# Patient Record
Sex: FEMALE | Race: WHITE
[De-identification: names, ages, dates, MRNs, and addresses within clinical notes are randomized per-mention and may not be internally consistent; named-entity substitution may affect disease eponyms.]

---

## 2020-02-11 ENCOUNTER — HOSPITAL ENCOUNTER (EMERGENCY)
Dept: HOSPITAL 12 - ER | Age: 86
Discharge: HOME | End: 2020-02-11
Payer: MEDICARE

## 2020-02-11 VITALS — HEIGHT: 65 IN | WEIGHT: 133 LBS | BODY MASS INDEX: 22.16 KG/M2

## 2020-02-11 DIAGNOSIS — Z88.0: ICD-10-CM
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
## 2020-02-11 DIAGNOSIS — Z88.1: ICD-10-CM
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
## 2020-02-11 DIAGNOSIS — Z88.2: ICD-10-CM

## 2020-02-11 DIAGNOSIS — S81.011A: Primary | ICD-10-CM

## 2020-02-11 PROCEDURE — A4217 STERILE WATER/SALINE, 500 ML: HCPCS

## 2020-02-11 PROCEDURE — A4663 DIALYSIS BLOOD PRESSURE CUFF: HCPCS

## 2020-02-11 PROCEDURE — 99283 EMERGENCY DEPT VISIT LOW MDM: CPT

## 2020-02-11 PROCEDURE — 90715 TDAP VACCINE 7 YRS/> IM: CPT

## 2020-02-11 PROCEDURE — 73564 X-RAY EXAM KNEE 4 OR MORE: CPT

## 2020-02-11 PROCEDURE — 90471 IMMUNIZATION ADMIN: CPT

## 2020-02-11 PROCEDURE — 12001 RPR S/N/AX/GEN/TRNK 2.5CM/<: CPT

## 2020-02-11 NOTE — NUR
right knee with stitches in place area medicated and covered with surgical 
dressing. DCD instructions given to patient, instructed to come back as 
indicated for stitches removal. Pt. verbalized understanding.

## 2021-04-13 ENCOUNTER — OFFICE (OUTPATIENT)
Dept: URBAN - METROPOLITAN AREA CLINIC 45 | Facility: CLINIC | Age: 86
End: 2021-04-13

## 2021-04-13 VITALS
DIASTOLIC BLOOD PRESSURE: 81 MMHG | WEIGHT: 133 LBS | HEIGHT: 64 IN | TEMPERATURE: 96.8 F | HEART RATE: 81 BPM | SYSTOLIC BLOOD PRESSURE: 157 MMHG

## 2021-04-13 DIAGNOSIS — Z80.0 FAMILY HISTORY OF MALIGNANT NEOPLASM OF COLON: ICD-10-CM

## 2021-04-13 DIAGNOSIS — R10.32 LLQ PAIN: ICD-10-CM

## 2021-04-13 DIAGNOSIS — K59.00 CONSTIPATION, CHRONIC: ICD-10-CM

## 2021-04-13 PROCEDURE — 99203 OFFICE O/P NEW LOW 30 MIN: CPT | Performed by: INTERNAL MEDICINE

## 2021-04-13 NOTE — SERVICEHPINOTES
The patient complains of abdominal pain.    Symptoms began   several  weeks   ago with onset that was    abrupt  .    It is localized as   left lower quadrant   pain.    It is described as   moderate   in nature.   Pain quality is described as   sharp  .    It also radiates to the   left lower quadrant  .    Discomfort typically lasts   several  minutes   and has a course which is   intermittent  .   It usually starts   abruptly  .    Symptom triggers include   nothing specific and eating  .  Alleviating factors include   nothing specific and defecation  .    Symptoms have been   progressive   since onset.    Alarm features noted:   none  .   The patient also reports   constipation  .      Symptoms have caused the patient to   see primary care physician   Similar symptoms   have not   occurred in the past, associated with    .   Noteworthy prior abdominal interventions include   .  COLONOSCOPY 2013   has been performed previously to evaluate the patient's symptoms.   Remedies tried to date include    Miralax   with    some benefit  .    Other pertinent testing to date includes,   none   Pt was having severe constipation, better with use of fiber and Miralax up to 2 x daily. No blood in stool or black stool. Of note pt's son diagnosed with stage 3 colon cancer about a year ago.

## 2021-04-14 LAB
CBC (INCLUDES DIFF/PLT): ABSOLUTE BAND NEUTROPHILS: NORMAL CELLS/UL
CBC (INCLUDES DIFF/PLT): ABSOLUTE BASOPHILS: 36 CELLS/UL (ref 0–200)
CBC (INCLUDES DIFF/PLT): ABSOLUTE BLASTS: NORMAL CELLS/UL
CBC (INCLUDES DIFF/PLT): ABSOLUTE EOSINOPHILS: 83 CELLS/UL (ref 15–500)
CBC (INCLUDES DIFF/PLT): ABSOLUTE LYMPHOCYTES: 2416 CELLS/UL (ref 850–3900)
CBC (INCLUDES DIFF/PLT): ABSOLUTE METAMYELOCYTES: NORMAL CELLS/UL
CBC (INCLUDES DIFF/PLT): ABSOLUTE MONOCYTES: 417 CELLS/UL (ref 200–950)
CBC (INCLUDES DIFF/PLT): ABSOLUTE MYELOCYTES: NORMAL CELLS/UL
CBC (INCLUDES DIFF/PLT): ABSOLUTE NEUTROPHILS: 8949 CELLS/UL — HIGH (ref 1500–7800)
CBC (INCLUDES DIFF/PLT): ABSOLUTE NUCLEATED RBC: 0 CELLS/UL (ref 0–?)
CBC (INCLUDES DIFF/PLT): ABSOLUTE PROMYELOCYTES: NORMAL CELLS/UL
CBC (INCLUDES DIFF/PLT): BAND NEUTROPHILS: NORMAL %
CBC (INCLUDES DIFF/PLT): BASOPHILS: 0.3 %
CBC (INCLUDES DIFF/PLT): BLASTS: NORMAL %
CBC (INCLUDES DIFF/PLT): COMMENT(S): NORMAL
CBC (INCLUDES DIFF/PLT): EOSINOPHILS: 0.7 %
CBC (INCLUDES DIFF/PLT): HEMATOCRIT: 33.6 % — LOW (ref 35–45)
CBC (INCLUDES DIFF/PLT): HEMOGLOBIN: 11.5 G/DL — LOW (ref 11.7–15.5)
CBC (INCLUDES DIFF/PLT): LYMPHOCYTES: 20.3 %
CBC (INCLUDES DIFF/PLT): MCH: 28.5 PG (ref 27–33)
CBC (INCLUDES DIFF/PLT): MCHC: 34.2 G/DL (ref 32–36)
CBC (INCLUDES DIFF/PLT): MCV: 83.4 FL (ref 80–100)
CBC (INCLUDES DIFF/PLT): METAMYELOCYTES: NORMAL %
CBC (INCLUDES DIFF/PLT): MONOCYTES: 3.5 %
CBC (INCLUDES DIFF/PLT): MPV: 10.8 FL (ref 7.5–12.5)
CBC (INCLUDES DIFF/PLT): MYELOCYTES: NORMAL %
CBC (INCLUDES DIFF/PLT): NEUTROPHILS: 75.2 %
CBC (INCLUDES DIFF/PLT): NUCLEATED RBC: NORMAL /100 WBC
CBC (INCLUDES DIFF/PLT): PLATELET COUNT: 307 THOUSAND/UL (ref 140–400)
CBC (INCLUDES DIFF/PLT): PROMYELOCYTES: NORMAL %
CBC (INCLUDES DIFF/PLT): RDW: 15.1 % — HIGH (ref 11–15)
CBC (INCLUDES DIFF/PLT): REACTIVE LYMPHOCYTES: NORMAL %
CBC (INCLUDES DIFF/PLT): RED BLOOD CELL COUNT: 4.03 MILLION/UL (ref 3.8–5.1)
CBC (INCLUDES DIFF/PLT): WHITE BLOOD CELL COUNT: 11.9 THOUSAND/UL — HIGH (ref 3.8–10.8)
COMPREHENSIVE METABOLIC PANEL: ALBUMIN/GLOBULIN RATIO: 1.6 (CALC) (ref 1–2.5)
COMPREHENSIVE METABOLIC PANEL: ALBUMIN: 4.1 G/DL (ref 3.6–5.1)
COMPREHENSIVE METABOLIC PANEL: ALKALINE PHOSPHATASE: 93 U/L (ref 37–153)
COMPREHENSIVE METABOLIC PANEL: ALT: 17 U/L (ref 6–29)
COMPREHENSIVE METABOLIC PANEL: AST: 19 U/L (ref 10–35)
COMPREHENSIVE METABOLIC PANEL: BILIRUBIN, TOTAL: 0.5 MG/DL (ref 0.2–1.2)
COMPREHENSIVE METABOLIC PANEL: BUN/CREATININE RATIO: 30 (CALC) — HIGH (ref 6–22)
COMPREHENSIVE METABOLIC PANEL: CALCIUM: 9.3 MG/DL (ref 8.6–10.4)
COMPREHENSIVE METABOLIC PANEL: CARBON DIOXIDE: 26 MMOL/L (ref 20–32)
COMPREHENSIVE METABOLIC PANEL: CHLORIDE: 106 MMOL/L (ref 98–110)
COMPREHENSIVE METABOLIC PANEL: CREATININE: 0.89 MG/DL — HIGH (ref 0.6–0.88)
COMPREHENSIVE METABOLIC PANEL: EGFR AFRICAN AMERICAN: 68 ML/MIN/1.73M2 (ref 60–?)
COMPREHENSIVE METABOLIC PANEL: EGFR NON-AFR. AMERICAN: 58 ML/MIN/1.73M2 — LOW (ref 60–?)
COMPREHENSIVE METABOLIC PANEL: GLOBULIN: 2.6 G/DL (CALC) (ref 1.9–3.7)
COMPREHENSIVE METABOLIC PANEL: GLUCOSE: 106 MG/DL (ref 65–139)
COMPREHENSIVE METABOLIC PANEL: POTASSIUM: 4.2 MMOL/L (ref 3.5–5.3)
COMPREHENSIVE METABOLIC PANEL: PROTEIN, TOTAL: 6.7 G/DL (ref 6.1–8.1)
COMPREHENSIVE METABOLIC PANEL: SODIUM: 139 MMOL/L (ref 135–146)
COMPREHENSIVE METABOLIC PANEL: UREA NITROGEN (BUN): 27 MG/DL — HIGH (ref 7–25)

## 2021-05-17 ENCOUNTER — AMBULATORY SURGICAL CENTER (OUTPATIENT)
Dept: URBAN - METROPOLITAN AREA SURGERY 37 | Facility: SURGERY | Age: 86
End: 2021-05-17

## 2021-05-17 VITALS
TEMPERATURE: 98.2 F | HEART RATE: 68 BPM | RESPIRATION RATE: 18 BRPM | DIASTOLIC BLOOD PRESSURE: 85 MMHG | WEIGHT: 133 LBS | OXYGEN SATURATION: 97 % | SYSTOLIC BLOOD PRESSURE: 142 MMHG | HEIGHT: 64 IN

## 2021-05-17 DIAGNOSIS — K57.30 DVRTCLOS OF LG INT W/O PERFORATION OR ABSCESS W/O BLEEDING: ICD-10-CM

## 2021-05-17 DIAGNOSIS — K31.89 OTHER DISEASES OF STOMACH AND DUODENUM: ICD-10-CM

## 2021-05-17 DIAGNOSIS — R93.3 ABNORMAL FINDINGS ON DX IMAGING OF PRT DIGESTIVE TRACT: ICD-10-CM

## 2021-05-17 DIAGNOSIS — Z12.11 ENCOUNTER FOR SCREENING FOR MALIGNANT NEOPLASM OF COLON: ICD-10-CM

## 2021-05-17 LAB — SURGICAL: PDF REPORT: (no result)

## 2021-05-17 PROCEDURE — 45330 DIAGNOSTIC SIGMOIDOSCOPY: CPT | Performed by: INTERNAL MEDICINE

## 2021-05-17 PROCEDURE — 43239 EGD BIOPSY SINGLE/MULTIPLE: CPT | Performed by: INTERNAL MEDICINE

## 2021-05-17 NOTE — SERVICEHPINOTES
The patient complains of abdominal pain. Symptoms began several weeks ago with onset that was abrupt. It is localized as left lower quadrant pain. It is described as moderate in nature. Pain quality is described as sharp. It also radiates to the left lower quadrant. Discomfort typically lasts several minutes and has a course which is intermittent. It usually starts abruptly. Symptom triggers include nothing specific and eating. Alleviating factors include nothing specific and defecation. Symptoms have been progressive since onset. Alarm features noted: none. The patient also reports constipation. Symptoms have caused the patient to see primary care physician. COLONOSCOPY 2013 has been performed previously to evaluate the patient's symptoms. Remedies tried to date include Miralax with some benefit. Other pertinent testing to date includes, none Pt was having severe constipation, better with use of fiber and Miralax up to 2 x daily. No blood in stool or black stool. Of note pt's son diagnosed with stage 3 colon cancer about a year ago.    Subsequent CT scan abd revealed thickening of wall of fundus and possible soft tissue mass in sigmoid.

## 2021-06-10 ENCOUNTER — OFFICE (OUTPATIENT)
Dept: URBAN - METROPOLITAN AREA CLINIC 45 | Facility: CLINIC | Age: 86
End: 2021-06-10

## 2021-06-10 VITALS
HEIGHT: 64 IN | HEART RATE: 74 BPM | WEIGHT: 135 LBS | TEMPERATURE: 97.2 F | DIASTOLIC BLOOD PRESSURE: 71 MMHG | SYSTOLIC BLOOD PRESSURE: 123 MMHG

## 2021-06-10 DIAGNOSIS — K59.00 CONSTIPATION, CHRONIC: ICD-10-CM

## 2021-06-10 DIAGNOSIS — I10 HYPERTENSION: ICD-10-CM

## 2021-06-10 DIAGNOSIS — R93.3 ABNORMAL GI STUDY/IMAGING: ICD-10-CM

## 2021-06-10 PROCEDURE — 99214 OFFICE O/P EST MOD 30 MIN: CPT | Performed by: INTERNAL MEDICINE

## 2021-06-10 NOTE — SERVICEHPINOTES
Pt sp incomplete   colonoscopy and BE for LLQ abd pain which has since subsided. Since the BE pt passing barium with stool and noticed air and whistling when urinating with discoloration and sediment in urine. No recent fever, chills or wt loss. No blood in stool or black stool Colonoscopy with very redundant sigmoid and tics. We have followed the patient for evaluation and treatment of chronic constipation.    Since last visit, the patient has tried   fiber supplements and Miralax (PEG)  .    Symptoms have   improved  .    Currently the patient evacuates on average once every   1  day  .    Stools are   hard and normal   in consistency.      Associated symptoms include   LLQ abdominal pain  .   Symptoms have been severe enough to cause the patient to   see primary care physician  .      Evaluation thus far has included   colonoscopy and air contrast barium enema  .   Pt had recent egd for abn CT scan. Bxs all neg